# Patient Record
Sex: MALE | Race: WHITE
[De-identification: names, ages, dates, MRNs, and addresses within clinical notes are randomized per-mention and may not be internally consistent; named-entity substitution may affect disease eponyms.]

---

## 2018-02-05 ENCOUNTER — HOSPITAL ENCOUNTER (OUTPATIENT)
Dept: HOSPITAL 45 - C.LAB | Age: 28
Discharge: HOME | End: 2018-02-05
Attending: PHYSICIAN ASSISTANT
Payer: COMMERCIAL

## 2018-02-05 DIAGNOSIS — N46.9: Primary | ICD-10-CM

## 2018-02-05 DIAGNOSIS — I86.1: ICD-10-CM

## 2018-02-05 LAB
FOLLICLE STIMULAT HORMONE: 6.28 IU/L
PROLACTIN SERPL-MCNC: 15.52 NG/ML
TESTOST SERPL-MCNC: 600.7 NG/DL

## 2018-02-05 NOTE — DIAGNOSTIC IMAGING REPORT
(TESTICULAR) SCROTUM-CONT



HISTORY: Infertility  VARICOCELE, MALE INFERTILITY



COMPARISON:  None.



FINDINGS:



Right testis: Uniform in echogenicity. Maximum dimension 5.4 cm. Normal vascular

flow



Left testis:  Maximum dimension 5.2 cm. Normal vascular flow. Right-sided

varicocele.



IMPRESSION:  



1. Normal testes bilaterally.





2. Right varicocele. 









The above report was generated using voice recognition software.  It may contain

grammatical, syntax or spelling errors.







Electronically signed by:  Jona Lindsay M.D.

2/5/2018 9:15 AM



Dictated Date/Time:  2/5/2018 9:14 AM